# Patient Record
(demographics unavailable — no encounter records)

---

## 2025-05-18 NOTE — HISTORY OF PRESENT ILLNESS
[de-identified] : UC follow-up for increased urinary frequency and conjunctivitis [FreeTextEntry6] : Jeyson is a 10 year old M coming in for UC follow-up for increased urinary frequency and conjunctivitis  For conjunctivitis, used polytrim 4x a day with improvement Possible allergic component Has taken allergy meds in the past  Parents felt that he was urinating more frequently Unable to get urine sample in UC No fevers, emesis or diarrhea.  Appetite at baseline

## 2025-05-18 NOTE — HISTORY OF PRESENT ILLNESS
[de-identified] : UC follow-up for increased urinary frequency and conjunctivitis [FreeTextEntry6] : Jeyson is a 10 year old M coming in for UC follow-up for increased urinary frequency and conjunctivitis  For conjunctivitis, used polytrim 4x a day with improvement Possible allergic component Has taken allergy meds in the past  Parents felt that he was urinating more frequently Unable to get urine sample in UC No fevers, emesis or diarrhea.  Appetite at baseline

## 2025-05-18 NOTE — PHYSICAL EXAM
[Valentin: ____] : Valentin [unfilled] [NL] : moves all extremities x4, warm, well perfused x4 [de-identified] : MMM

## 2025-05-18 NOTE — DISCUSSION/SUMMARY
[FreeTextEntry1] : Jeyson is a 10 year old M coming in for acute visit for UC follow-up for conjunctivitis and increased urinary frequency.   Conjunctivitis improved. If having seasonal allergy symptoms:  Discussed starting daily antihistamine, Zyrtec 5mg. If having eye symptoms, use allergic eye drops 1 drop in each eye twice a day. can use cool compresses throughout day. For nasal congestion, use Flonase 1 spray in each nare. Recommend washing face and hands after being outside and changing clothes after coming in inside. Return if symptoms are persisting or worsening.  Due to increased urinary frequency with no associated symptoms, will send UA and urine culture. Unable to obtain sample in clinic, sent [facility] with supplies to collect sample at home.

## 2025-05-18 NOTE — PHYSICAL EXAM
[Valentin: ____] : Valentin [unfilled] [NL] : moves all extremities x4, warm, well perfused x4 [de-identified] : MMM